# Patient Record
Sex: FEMALE | Race: WHITE | NOT HISPANIC OR LATINO | Employment: UNEMPLOYED | URBAN - METROPOLITAN AREA
[De-identification: names, ages, dates, MRNs, and addresses within clinical notes are randomized per-mention and may not be internally consistent; named-entity substitution may affect disease eponyms.]

---

## 2023-04-20 ENCOUNTER — TELEPHONE (OUTPATIENT)
Dept: PRIMARY CARE | Facility: CLINIC | Age: 63
End: 2023-04-20
Payer: COMMERCIAL

## 2023-04-20 DIAGNOSIS — J30.9 ALLERGIC RHINITIS, UNSPECIFIED SEASONALITY, UNSPECIFIED TRIGGER: Primary | ICD-10-CM

## 2023-04-20 RX ORDER — FLUTICASONE PROPIONATE 50 MCG
2 SPRAY, SUSPENSION (ML) NASAL DAILY
Qty: 48 G | Refills: 1 | Status: SHIPPED | OUTPATIENT
Start: 2023-04-20

## 2023-04-20 RX ORDER — FLUTICASONE PROPIONATE 50 MCG
2 SPRAY, SUSPENSION (ML) NASAL DAILY
COMMUNITY
End: 2023-04-20 | Stop reason: SDUPTHER

## 2023-04-20 NOTE — TELEPHONE ENCOUNTER
Patient left vm on Rx line requesting Rx for Flonase Nasal Jewett to be sent to [facility] pharmacy.

## 2023-09-05 ENCOUNTER — TELEPHONE (OUTPATIENT)
Dept: PRIMARY CARE | Facility: CLINIC | Age: 63
End: 2023-09-05
Payer: COMMERCIAL

## 2023-09-05 DIAGNOSIS — I10 PRIMARY HYPERTENSION: Primary | ICD-10-CM

## 2023-09-05 NOTE — TELEPHONE ENCOUNTER
Name of medication & dose: lisinopril      Quantity & refills requested: n/a    Amount of medication remaining:  n/a    If out of medication, for how long?  n/a    Last office visit:  3/3/2023    Last labs (if applicable):      Future appointment?  10/2/2023    [facility]    Allergies updated.       The patient was informed the requested medication request will be processed within 3 business days unless they are contacted by a staff member to advise otherwise.

## 2023-09-06 RX ORDER — LISINOPRIL 20 MG/1
20 TABLET ORAL DAILY
COMMUNITY
Start: 2021-10-18 | End: 2023-09-06 | Stop reason: SDUPTHER

## 2023-09-06 RX ORDER — LISINOPRIL 20 MG/1
20 TABLET ORAL DAILY
Qty: 90 TABLET | Refills: 0 | Status: SHIPPED | OUTPATIENT
Start: 2023-09-06 | End: 2023-10-02 | Stop reason: SDUPTHER

## 2023-10-02 ENCOUNTER — OFFICE VISIT (OUTPATIENT)
Dept: PRIMARY CARE | Facility: CLINIC | Age: 63
End: 2023-10-02
Payer: COMMERCIAL

## 2023-10-02 VITALS
DIASTOLIC BLOOD PRESSURE: 75 MMHG | BODY MASS INDEX: 24.8 KG/M2 | WEIGHT: 142.6 LBS | SYSTOLIC BLOOD PRESSURE: 128 MMHG | OXYGEN SATURATION: 94 % | HEART RATE: 93 BPM

## 2023-10-02 DIAGNOSIS — B00.1 HERPES SIMPLEX LABIALIS: ICD-10-CM

## 2023-10-02 DIAGNOSIS — E11.9 TYPE 2 DIABETES MELLITUS WITHOUT COMPLICATION, WITHOUT LONG-TERM CURRENT USE OF INSULIN (MULTI): Primary | ICD-10-CM

## 2023-10-02 DIAGNOSIS — I10 HYPERTENSION, BENIGN: ICD-10-CM

## 2023-10-02 DIAGNOSIS — E78.2 MIXED HYPERLIPIDEMIA: ICD-10-CM

## 2023-10-02 DIAGNOSIS — F41.1 GENERALIZED ANXIETY DISORDER WITH PANIC ATTACKS: ICD-10-CM

## 2023-10-02 DIAGNOSIS — F41.0 GENERALIZED ANXIETY DISORDER WITH PANIC ATTACKS: ICD-10-CM

## 2023-10-02 PROBLEM — J30.9 ALLERGIC RHINITIS: Status: ACTIVE | Noted: 2023-10-02

## 2023-10-02 PROBLEM — J45.20 MILD INTERMITTENT ASTHMA WITHOUT COMPLICATION (HHS-HCC): Status: ACTIVE | Noted: 2023-10-02

## 2023-10-02 PROBLEM — M06.9 RHEUMATOID ARTHRITIS (MULTI): Status: ACTIVE | Noted: 2023-10-02

## 2023-10-02 PROCEDURE — 3078F DIAST BP <80 MM HG: CPT | Performed by: FAMILY MEDICINE

## 2023-10-02 PROCEDURE — 4010F ACE/ARB THERAPY RXD/TAKEN: CPT | Performed by: FAMILY MEDICINE

## 2023-10-02 PROCEDURE — 99214 OFFICE O/P EST MOD 30 MIN: CPT | Performed by: FAMILY MEDICINE

## 2023-10-02 PROCEDURE — 3074F SYST BP LT 130 MM HG: CPT | Performed by: FAMILY MEDICINE

## 2023-10-02 RX ORDER — IBUPROFEN 200 MG
TABLET ORAL
COMMUNITY
Start: 2022-09-09

## 2023-10-02 RX ORDER — TRAMADOL HYDROCHLORIDE 50 MG/1
TABLET ORAL
COMMUNITY

## 2023-10-02 RX ORDER — FOLIC ACID 1 MG/1
1 TABLET ORAL DAILY
COMMUNITY

## 2023-10-02 RX ORDER — METFORMIN HYDROCHLORIDE 500 MG/1
1 TABLET ORAL DAILY
COMMUNITY
End: 2023-10-02 | Stop reason: SDUPTHER

## 2023-10-02 RX ORDER — ATORVASTATIN CALCIUM 40 MG/1
1 TABLET, FILM COATED ORAL DAILY
COMMUNITY
Start: 2021-10-06 | End: 2023-10-02 | Stop reason: SDUPTHER

## 2023-10-02 RX ORDER — METHOTREXATE 2.5 MG/1
TABLET ORAL
COMMUNITY

## 2023-10-02 RX ORDER — LISINOPRIL 20 MG/1
20 TABLET ORAL DAILY
Qty: 90 TABLET | Refills: 1 | Status: SHIPPED | OUTPATIENT
Start: 2023-10-02 | End: 2023-12-13 | Stop reason: SDUPTHER

## 2023-10-02 RX ORDER — ATORVASTATIN CALCIUM 40 MG/1
40 TABLET, FILM COATED ORAL DAILY
Qty: 90 TABLET | Refills: 1 | Status: SHIPPED | OUTPATIENT
Start: 2023-10-02 | End: 2024-03-07 | Stop reason: SDUPTHER

## 2023-10-02 RX ORDER — CITALOPRAM 20 MG/1
20 TABLET, FILM COATED ORAL DAILY
Qty: 90 TABLET | Refills: 1 | Status: SHIPPED | OUTPATIENT
Start: 2023-10-02 | End: 2024-03-07 | Stop reason: SDUPTHER

## 2023-10-02 RX ORDER — CITALOPRAM 20 MG/1
1 TABLET, FILM COATED ORAL DAILY
COMMUNITY
End: 2023-10-02 | Stop reason: SDUPTHER

## 2023-10-02 RX ORDER — ACYCLOVIR 50 MG/G
CREAM TOPICAL
COMMUNITY
Start: 2023-03-03 | End: 2023-10-02 | Stop reason: SDUPTHER

## 2023-10-02 RX ORDER — ACYCLOVIR 50 MG/G
CREAM TOPICAL
Qty: 5 G | Refills: 1 | Status: SHIPPED | OUTPATIENT
Start: 2023-10-02

## 2023-10-02 RX ORDER — ETANERCEPT 50 MG/ML
SOLUTION SUBCUTANEOUS
COMMUNITY
Start: 2023-09-07

## 2023-10-02 RX ORDER — METFORMIN HYDROCHLORIDE 500 MG/1
500 TABLET ORAL DAILY
Qty: 90 TABLET | Refills: 1 | Status: SHIPPED | OUTPATIENT
Start: 2023-10-02 | End: 2024-03-07 | Stop reason: DRUGHIGH

## 2023-10-02 ASSESSMENT — ENCOUNTER SYMPTOMS
FATIGUE: 0
ABDOMINAL PAIN: 0
ARTHRALGIAS: 1
UNEXPECTED WEIGHT CHANGE: 0
SHORTNESS OF BREATH: 0

## 2023-10-02 ASSESSMENT — PATIENT HEALTH QUESTIONNAIRE - PHQ9
2. FEELING DOWN, DEPRESSED OR HOPELESS: NOT AT ALL
1. LITTLE INTEREST OR PLEASURE IN DOING THINGS: NOT AT ALL
SUM OF ALL RESPONSES TO PHQ9 QUESTIONS 1 AND 2: 0

## 2023-10-02 NOTE — PROGRESS NOTES
Subjective   Patient ID: Akua Fernandez is a 62 y.o. female who presents for Follow-up (Follow up /Needs citalopram, metformin, lisinnopril, atorvastatin, and acyclovir cream refilled).  HPI  Akua presents for follow up.  She feels well.    Review of Systems   Constitutional:  Negative for fatigue and unexpected weight change.   Eyes:  Negative for visual disturbance.   Respiratory:  Negative for shortness of breath.    Cardiovascular:  Negative for chest pain.   Gastrointestinal:  Negative for abdominal pain.   Musculoskeletal:  Positive for arthralgias.       Objective   Vitals:    10/02/23 1427   BP: 128/75   BP Location: Right arm   Patient Position: Sitting   BP Cuff Size: Adult   Pulse: 93   SpO2: 94%   Weight: 64.7 kg (142 lb 9.6 oz)      Physical Exam  Constitutional:       Appearance: Normal appearance.   HENT:      Head: Normocephalic and atraumatic.   Eyes:      Extraocular Movements: Extraocular movements intact.      Pupils: Pupils are equal, round, and reactive to light.   Cardiovascular:      Rate and Rhythm: Normal rate and regular rhythm.   Pulmonary:      Effort: Pulmonary effort is normal.      Breath sounds: Normal breath sounds.   Abdominal:      General: There is no distension.      Tenderness: There is no abdominal tenderness.   Musculoskeletal:      Cervical back: Normal range of motion and neck supple.   Neurological:      General: No focal deficit present.      Mental Status: She is alert and oriented to person, place, and time.   Psychiatric:         Mood and Affect: Mood normal.         Behavior: Behavior normal.         Assessment/Plan   There are no diagnoses linked to this encounter.    Problem List Items Addressed This Visit             ICD-10-CM    Type 2 diabetes mellitus without complication, without long-term current use of insulin (CMS/[facility]) - Primary E11.9     Due for repeat blood work         Relevant Medications    metFORMIN (Glucophage) 500 mg tablet    lisinopril 20 mg tablet     atorvastatin (Lipitor) 40 mg tablet    Other Relevant Orders    Hemoglobin A1c    Lipid panel    Mixed hyperlipidemia E78.2    Relevant Medications    atorvastatin (Lipitor) 40 mg tablet    Hypertension, benign I10     Good control, continue current medication         Relevant Medications    lisinopril 20 mg tablet    Generalized anxiety disorder with panic attacks F41.1, F41.0    Relevant Medications    citalopram (CeleXA) 20 mg tablet    Herpes simplex labialis B00.1    Relevant Medications    acyclovir (Zovirax) 5 % cream

## 2023-12-13 DIAGNOSIS — I10 HYPERTENSION, BENIGN: ICD-10-CM

## 2023-12-13 DIAGNOSIS — E11.9 TYPE 2 DIABETES MELLITUS WITHOUT COMPLICATION, WITHOUT LONG-TERM CURRENT USE OF INSULIN (MULTI): ICD-10-CM

## 2023-12-13 RX ORDER — LISINOPRIL 20 MG/1
20 TABLET ORAL DAILY
Qty: 90 TABLET | Refills: 1 | Status: SHIPPED | OUTPATIENT
Start: 2023-12-13 | End: 2024-06-07

## 2023-12-13 NOTE — TELEPHONE ENCOUNTER
Patient left vm on Rx line requesting refill on Lisinopril to be sent to local [facility] pharm in chart.

## 2024-03-07 ENCOUNTER — OFFICE VISIT (OUTPATIENT)
Dept: PRIMARY CARE | Facility: CLINIC | Age: 64
End: 2024-03-07
Payer: COMMERCIAL

## 2024-03-07 VITALS
HEART RATE: 107 BPM | BODY MASS INDEX: 25.57 KG/M2 | SYSTOLIC BLOOD PRESSURE: 130 MMHG | DIASTOLIC BLOOD PRESSURE: 74 MMHG | WEIGHT: 147 LBS | OXYGEN SATURATION: 96 %

## 2024-03-07 DIAGNOSIS — E78.2 MIXED HYPERLIPIDEMIA: ICD-10-CM

## 2024-03-07 DIAGNOSIS — F41.1 GENERALIZED ANXIETY DISORDER WITH PANIC ATTACKS: ICD-10-CM

## 2024-03-07 DIAGNOSIS — M06.9 RHEUMATOID ARTHRITIS, INVOLVING UNSPECIFIED SITE, UNSPECIFIED WHETHER RHEUMATOID FACTOR PRESENT (MULTI): ICD-10-CM

## 2024-03-07 DIAGNOSIS — J45.20 MILD INTERMITTENT ASTHMA WITHOUT COMPLICATION (HHS-HCC): ICD-10-CM

## 2024-03-07 DIAGNOSIS — F41.0 GENERALIZED ANXIETY DISORDER WITH PANIC ATTACKS: ICD-10-CM

## 2024-03-07 DIAGNOSIS — E11.9 TYPE 2 DIABETES MELLITUS WITHOUT COMPLICATION, WITHOUT LONG-TERM CURRENT USE OF INSULIN (MULTI): ICD-10-CM

## 2024-03-07 DIAGNOSIS — K51.90 ULCERATIVE COLITIS WITHOUT COMPLICATIONS, UNSPECIFIED LOCATION (MULTI): Primary | ICD-10-CM

## 2024-03-07 LAB — POC HEMOGLOBIN A1C: 9.9 % (ref 4.2–6.5)

## 2024-03-07 PROCEDURE — 3078F DIAST BP <80 MM HG: CPT | Performed by: FAMILY MEDICINE

## 2024-03-07 PROCEDURE — 83036 HEMOGLOBIN GLYCOSYLATED A1C: CPT | Performed by: FAMILY MEDICINE

## 2024-03-07 PROCEDURE — 99214 OFFICE O/P EST MOD 30 MIN: CPT | Performed by: FAMILY MEDICINE

## 2024-03-07 PROCEDURE — 3075F SYST BP GE 130 - 139MM HG: CPT | Performed by: FAMILY MEDICINE

## 2024-03-07 PROCEDURE — 4010F ACE/ARB THERAPY RXD/TAKEN: CPT | Performed by: FAMILY MEDICINE

## 2024-03-07 RX ORDER — ATORVASTATIN CALCIUM 40 MG/1
40 TABLET, FILM COATED ORAL DAILY
Qty: 90 TABLET | Refills: 1 | Status: SHIPPED | OUTPATIENT
Start: 2024-03-07 | End: 2024-09-03

## 2024-03-07 RX ORDER — CITALOPRAM 20 MG/1
20 TABLET, FILM COATED ORAL DAILY
Qty: 90 TABLET | Refills: 1 | Status: SHIPPED | OUTPATIENT
Start: 2024-03-07 | End: 2024-09-03

## 2024-03-07 ASSESSMENT — ENCOUNTER SYMPTOMS
SHORTNESS OF BREATH: 0
SLEEP DISTURBANCE: 0
FATIGUE: 0
NERVOUS/ANXIOUS: 0
BACK PAIN: 0
HYPERTENSION: 1
ABDOMINAL PAIN: 0
UNEXPECTED WEIGHT CHANGE: 0
ARTHRALGIAS: 0
DYSPHORIC MOOD: 0
NECK PAIN: 0

## 2024-03-07 ASSESSMENT — PATIENT HEALTH QUESTIONNAIRE - PHQ9
1. LITTLE INTEREST OR PLEASURE IN DOING THINGS: NOT AT ALL
2. FEELING DOWN, DEPRESSED OR HOPELESS: NOT AT ALL
SUM OF ALL RESPONSES TO PHQ9 QUESTIONS 1 AND 2: 0

## 2024-03-07 NOTE — PROGRESS NOTES
Subjective   Patient ID: Akua Fernandez is a 63 y.o. female who presents for Diabetes, Anxiety, Hyperlipidemia, and Hypertension (PT IS HERE FOR 6 MONTH FOLLOW UP, FOR DIABETES, HTN, ANXIETY).  Diabetes  Pertinent negatives for hypoglycemia include no nervousness/anxiousness. Pertinent negatives for diabetes include no chest pain and no fatigue.   Anxiety  Patient reports no chest pain, nervous/anxious behavior or shortness of breath.       Hyperlipidemia  Pertinent negatives include no chest pain or shortness of breath.   Hypertension  Associated symptoms include anxiety. Pertinent negatives include no chest pain, neck pain or shortness of breath.     Akua presents for follow up visit.  She generally feels well.  She has cut down to 3 cigarettes per day!!  She is taking Metformin in morning and tolerating better.  Has been recording blood sugar readings, mostly in 100 range.  Review of Systems   Constitutional:  Negative for fatigue and unexpected weight change.   Eyes:  Negative for visual disturbance.   Respiratory:  Negative for shortness of breath.    Cardiovascular:  Negative for chest pain.   Gastrointestinal:  Negative for abdominal pain.   Musculoskeletal:  Negative for arthralgias, back pain and neck pain.   Psychiatric/Behavioral:  Negative for dysphoric mood and sleep disturbance. The patient is not nervous/anxious.        Objective   Vitals:    03/07/24 1254   BP: 130/74   Pulse: 107   SpO2: 96%   Weight: 66.7 kg (147 lb)      Physical Exam    Assessment/Plan   Diagnoses and all orders for this visit:  Type 2 diabetes mellitus without complication, without long-term current use of insulin (CMS/[facility])  Mixed hyperlipidemia  Generalized anxiety disorder with panic attacks      Problem List Items Addressed This Visit             ICD-10-CM    Type 2 diabetes mellitus without complication, without long-term current use of insulin (CMS/[facility]) E11.9     A1c 9.9.  Will change oral medication to Synjardy, to increase  dose of Metformin and add SGLT-2.  Will follow up in 3 months.         Relevant Medications    atorvastatin (Lipitor) 40 mg tablet    empagliflozin-metformin (Synjardy XR) 5-1,000 mg 24 hr tablet    Other Relevant Orders    POCT glycosylated hemoglobin (Hb A1C) manually resulted    Follow Up In Primary Care - Established    Rheumatoid arthritis (CMS/[facility]) M06.9     Stable.         Mixed hyperlipidemia E78.2     Continue statin         Relevant Medications    atorvastatin (Lipitor) 40 mg tablet    Mild intermittent asthma without complication J45.20     Improved since cutting down on smoking!         Generalized anxiety disorder with panic attacks F41.1, F41.0     Currently stable         Relevant Medications    citalopram (CeleXA) 20 mg tablet    Ulcerative colitis without complications, unspecified location (CMS/[facility]) - Primary K51.90     Currently asymptomatic

## 2024-03-07 NOTE — ASSESSMENT & PLAN NOTE
A1c 9.9.  Will change oral medication to Synjardy, to increase dose of Metformin and add SGLT-2.  Will follow up in 3 months.

## 2024-03-25 ENCOUNTER — PROCEDURE VISIT (OUTPATIENT)
Dept: PRIMARY CARE | Facility: CLINIC | Age: 64
End: 2024-03-25
Payer: COMMERCIAL

## 2024-03-25 VITALS
BODY MASS INDEX: 25.29 KG/M2 | HEART RATE: 99 BPM | DIASTOLIC BLOOD PRESSURE: 73 MMHG | SYSTOLIC BLOOD PRESSURE: 118 MMHG | WEIGHT: 145.4 LBS | OXYGEN SATURATION: 94 %

## 2024-03-25 DIAGNOSIS — R20.9 DISTURBANCE OF SKIN SENSATION: ICD-10-CM

## 2024-03-25 DIAGNOSIS — L91.8 INFLAMED ACROCHORDON: Primary | ICD-10-CM

## 2024-03-25 PROCEDURE — 99212 OFFICE O/P EST SF 10 MIN: CPT | Performed by: FAMILY MEDICINE

## 2024-03-25 ASSESSMENT — PATIENT HEALTH QUESTIONNAIRE - PHQ9
1. LITTLE INTEREST OR PLEASURE IN DOING THINGS: NOT AT ALL
SUM OF ALL RESPONSES TO PHQ9 QUESTIONS 1 AND 2: 0
2. FEELING DOWN, DEPRESSED OR HOPELESS: NOT AT ALL

## 2024-03-25 ASSESSMENT — ENCOUNTER SYMPTOMS
ABDOMINAL PAIN: 0
FEVER: 0

## 2024-03-25 NOTE — PROGRESS NOTES
Subjective   Patient ID: Akua Fernandez is a 63 y.o. female who presents for No chief complaint on file..  HPI  Akua presents for removal of skin tags.  Review of Systems   Constitutional:  Negative for fever.   Gastrointestinal:  Negative for abdominal pain (tolerating Synjardy).       Objective   Vitals:    03/25/24 1426   BP: 118/73   Pulse: 99   SpO2: 94%   Weight: 66 kg (145 lb 6.4 oz)      Physical Exam  Constitutional:       General: She is not in acute distress.     Appearance: Normal appearance.   HENT:      Head: Normocephalic and atraumatic.      Right Ear: External ear normal.      Left Ear: External ear normal.      Nose: Nose normal.   Eyes:      Conjunctiva/sclera: Conjunctivae normal.   Pulmonary:      Effort: Pulmonary effort is normal. No respiratory distress.   Skin:         Neurological:      Mental Status: She is alert and oriented to person, place, and time.   Psychiatric:         Mood and Affect: Mood normal.         Behavior: Behavior normal.         Thought Content: Thought content normal.     Patient ID: Akua Fernandez is a 63 y.o. female.    Skin Tag Removal    Date/Time: 3/25/2024 2:57 PM    Performed by: Guru Pichardo MD  Authorized by: Guru Pichardo MD    Consent:     Consent obtained:  Verbal    Consent given by:  Patient    Risks, benefits, and alternatives were discussed: yes      Risks discussed:  Bleeding and infection    Alternatives discussed:  No treatment  Universal protocol:     Procedure explained and questions answered to patient or proxy's satisfaction: yes      Patient identity confirmed:  Verbally with patient  Indications:     Indications:  Disturbance skin sensation  Pre-procedure details:     Skin preparation:  Chlorhexidine with alcohol  Sedation:     Sedation type:  None  Anesthesia:     Anesthesia method:  Local infiltration    Local anesthetic:  Lidocaine 2% w/o epi  Procedure specific details:      Removed 10 acrochordons from anterior neck with sharp des  scissors after local anesthetic applied.  Sterile bandage applied to any that had some minimal bleeding  Post-procedure details:     Procedure completion:  Tolerated      Assessment/Plan   There are no diagnoses linked to this encounter.    Problem List Items Addressed This Visit    None  Visit Diagnoses         Codes    Inflamed acrochordon    -  Primary L91.8    Removed as detailed above.     Disturbance of skin sensation     R20.9

## 2024-04-05 ENCOUNTER — TELEPHONE (OUTPATIENT)
Dept: PRIMARY CARE | Facility: CLINIC | Age: 64
End: 2024-04-05
Payer: COMMERCIAL

## 2024-04-05 NOTE — TELEPHONE ENCOUNTER
PT notified she could start taking her previous medication, and if she was still experiencing stomach issues to let the DR know. PT stated she would start medication on 4/7/2024 to allow other medication to be out of her system.

## 2024-04-05 NOTE — TELEPHONE ENCOUNTER
Yes,    Akua can try going back to Metformin twice daily, if no improvement in stomach issues to let me know

## 2024-05-08 DIAGNOSIS — E11.9 TYPE 2 DIABETES MELLITUS WITHOUT COMPLICATION, WITHOUT LONG-TERM CURRENT USE OF INSULIN (MULTI): ICD-10-CM

## 2024-05-08 NOTE — TELEPHONE ENCOUNTER
PT called RX refill line requesting a refill for her metformin to be sent to the [facility] pharmacy.  Last OV 3/7/24  Last RX fill 3/7/24 90 tablets/0 refills daily

## 2024-05-15 ENCOUNTER — TELEPHONE (OUTPATIENT)
Dept: PRIMARY CARE | Facility: CLINIC | Age: 64
End: 2024-05-15
Payer: COMMERCIAL

## 2024-05-15 NOTE — TELEPHONE ENCOUNTER
PT called RX refill line requesting a refill on metformin to be sent to the [facility] pharmacy on [address] that is on file.    I called and left PT a vm that RX was sent on 5/8/24 to the pharmacy for a 90 day supply if she had any questions to call the office.

## 2024-05-16 ENCOUNTER — HOSPITAL ENCOUNTER (OUTPATIENT)
Dept: RADIOLOGY | Facility: CLINIC | Age: 64
Discharge: HOME | End: 2024-05-16
Payer: COMMERCIAL

## 2024-05-16 VITALS — BODY MASS INDEX: 24.75 KG/M2 | HEIGHT: 64 IN | WEIGHT: 145 LBS

## 2024-05-16 DIAGNOSIS — R92.8 OTHER ABNORMAL AND INCONCLUSIVE FINDINGS ON DIAGNOSTIC IMAGING OF BREAST: ICD-10-CM

## 2024-05-16 PROCEDURE — 76642 ULTRASOUND BREAST LIMITED: CPT | Mod: 50

## 2024-05-16 PROCEDURE — 77062 BREAST TOMOSYNTHESIS BI: CPT

## 2024-05-19 DIAGNOSIS — E11.9 TYPE 2 DIABETES MELLITUS WITHOUT COMPLICATION, WITHOUT LONG-TERM CURRENT USE OF INSULIN (MULTI): ICD-10-CM

## 2024-05-20 RX ORDER — METFORMIN HYDROCHLORIDE 1000 MG/1
1000 TABLET ORAL
Qty: 180 TABLET | Refills: 3 | Status: SHIPPED | OUTPATIENT
Start: 2024-05-20 | End: 2025-05-20

## 2024-06-07 DIAGNOSIS — E11.9 TYPE 2 DIABETES MELLITUS WITHOUT COMPLICATION, WITHOUT LONG-TERM CURRENT USE OF INSULIN (MULTI): ICD-10-CM

## 2024-06-07 DIAGNOSIS — I10 HYPERTENSION, BENIGN: ICD-10-CM

## 2024-06-07 RX ORDER — LISINOPRIL 20 MG/1
20 TABLET ORAL DAILY
Qty: 90 TABLET | Refills: 1 | Status: SHIPPED | OUTPATIENT
Start: 2024-06-07

## 2024-12-05 ENCOUNTER — APPOINTMENT (OUTPATIENT)
Dept: RADIOLOGY | Facility: HOSPITAL | Age: 64
End: 2024-12-05
Payer: COMMERCIAL

## 2024-12-05 ENCOUNTER — HOSPITAL ENCOUNTER (OUTPATIENT)
Dept: RADIOLOGY | Facility: CLINIC | Age: 64
Discharge: HOME | End: 2024-12-05
Payer: COMMERCIAL

## 2024-12-05 DIAGNOSIS — R92.8 OTHER ABNORMAL AND INCONCLUSIVE FINDINGS ON DIAGNOSTIC IMAGING OF BREAST: ICD-10-CM

## 2024-12-05 DIAGNOSIS — R92.8 ABNORMAL FINDING ON BREAST IMAGING: ICD-10-CM

## 2024-12-05 PROCEDURE — 77062 BREAST TOMOSYNTHESIS BI: CPT

## 2024-12-05 PROCEDURE — 76642 ULTRASOUND BREAST LIMITED: CPT | Mod: RT

## 2024-12-05 PROCEDURE — 76982 USE 1ST TARGET LESION: CPT | Mod: RT

## 2024-12-05 NOTE — PROGRESS NOTES
I had the pleasure of meeting with Akua and discussing her need for a right breast biopsy. I scheduled her with Dr. Sood on 12-9 and then her biopsy on 12-10. I explained the process and procedure. Patient states that she understands. I gave her the folder and my card to call with any further questions.

## 2024-12-09 ENCOUNTER — OFFICE VISIT (OUTPATIENT)
Dept: SURGERY | Facility: CLINIC | Age: 64
End: 2024-12-09
Payer: COMMERCIAL

## 2024-12-09 VITALS
OXYGEN SATURATION: 95 % | BODY MASS INDEX: 23.66 KG/M2 | WEIGHT: 142 LBS | DIASTOLIC BLOOD PRESSURE: 68 MMHG | TEMPERATURE: 97.8 F | RESPIRATION RATE: 18 BRPM | HEIGHT: 65 IN | SYSTOLIC BLOOD PRESSURE: 129 MMHG | HEART RATE: 104 BPM

## 2024-12-09 DIAGNOSIS — R92.8 ABNORMAL MAMMOGRAM OF RIGHT BREAST: Primary | ICD-10-CM

## 2024-12-09 PROCEDURE — 3074F SYST BP LT 130 MM HG: CPT | Performed by: SURGERY

## 2024-12-09 PROCEDURE — 4010F ACE/ARB THERAPY RXD/TAKEN: CPT | Performed by: SURGERY

## 2024-12-09 PROCEDURE — 3078F DIAST BP <80 MM HG: CPT | Performed by: SURGERY

## 2024-12-09 PROCEDURE — 3008F BODY MASS INDEX DOCD: CPT | Performed by: SURGERY

## 2024-12-09 PROCEDURE — 99213 OFFICE O/P EST LOW 20 MIN: CPT | Performed by: SURGERY

## 2024-12-09 PROCEDURE — 99203 OFFICE O/P NEW LOW 30 MIN: CPT | Performed by: SURGERY

## 2024-12-09 SDOH — ECONOMIC STABILITY: FOOD INSECURITY: WITHIN THE PAST 12 MONTHS, THE FOOD YOU BOUGHT JUST DIDN'T LAST AND YOU DIDN'T HAVE MONEY TO GET MORE.: NEVER TRUE

## 2024-12-09 SDOH — ECONOMIC STABILITY: FOOD INSECURITY: WITHIN THE PAST 12 MONTHS, YOU WORRIED THAT YOUR FOOD WOULD RUN OUT BEFORE YOU GOT MONEY TO BUY MORE.: NEVER TRUE

## 2024-12-09 ASSESSMENT — PATIENT HEALTH QUESTIONNAIRE - PHQ9
2. FEELING DOWN, DEPRESSED OR HOPELESS: NOT AT ALL
1. LITTLE INTEREST OR PLEASURE IN DOING THINGS: NOT AT ALL
SUM OF ALL RESPONSES TO PHQ9 QUESTIONS 1 & 2: 0

## 2024-12-09 ASSESSMENT — LIFESTYLE VARIABLES
HOW OFTEN DO YOU HAVE SIX OR MORE DRINKS ON ONE OCCASION: NEVER
SKIP TO QUESTIONS 9-10: 1
HOW OFTEN DO YOU HAVE A DRINK CONTAINING ALCOHOL: MONTHLY OR LESS
HOW MANY STANDARD DRINKS CONTAINING ALCOHOL DO YOU HAVE ON A TYPICAL DAY: 1 OR 2
AUDIT-C TOTAL SCORE: 1

## 2024-12-09 ASSESSMENT — ENCOUNTER SYMPTOMS
OCCASIONAL FEELINGS OF UNSTEADINESS: 0
LOSS OF SENSATION IN FEET: 0
DEPRESSION: 0

## 2024-12-09 ASSESSMENT — PAIN SCALES - GENERAL: PAINLEVEL_OUTOF10: 0-NO PAIN

## 2024-12-09 ASSESSMENT — COLUMBIA-SUICIDE SEVERITY RATING SCALE - C-SSRS
6. HAVE YOU EVER DONE ANYTHING, STARTED TO DO ANYTHING, OR PREPARED TO DO ANYTHING TO END YOUR LIFE?: NO
2. HAVE YOU ACTUALLY HAD ANY THOUGHTS OF KILLING YOURSELF?: NO
1. IN THE PAST MONTH, HAVE YOU WISHED YOU WERE DEAD OR WISHED YOU COULD GO TO SLEEP AND NOT WAKE UP?: NO

## 2024-12-09 NOTE — PROGRESS NOTES
History Of Present Illness  HPI   63-year-old female  3 para 4 underwent 6-month bilateral follow-up mammograms with ultrasounds for multiple densities which demonstrated increase in size of a solid lesion identified at the 1 o'clock position of the right breast.  She currently denies any mass dimpling discharge.  No prior history of breast surgery.  Her father  from metastatic prostate cancer diagnosed when he was in his 70s.  She is an everyday smoker.  Did not breast-feed.  Age of first menstrual period 14.  Age of first childbirth 26.  Last menstrual period was several years ago she does not recall when.  She does have a history of rheumatoid arthritis and ulcerative colitis.  Past Medical History  She has a past medical history of Endometrial hyperplasia, unspecified, Fracture of unspecified phalanx of unspecified finger, initial encounter for closed fracture (2021), Open bite of unspecified finger without damage to nail, initial encounter (2021), and Personal history of other diseases of the digestive system.    Surgical History  She has a past surgical history that includes Other surgical history (2021); Other surgical history (2021); and Other surgical history (2021).     Social History  She reports that she has been smoking cigarettes. She has never used smokeless tobacco. She reports current alcohol use. She reports that she does not use drugs.    Family History  Family History   Problem Relation Name Age of Onset    Breast cancer Mother  50 - 59        Allergies  Sulfur, Sulfa (sulfonamide antibiotics), and Sulfasalazine    Review of Systems 10 review of systems otherwise negative     Visit Vitals  /68   Pulse 104   Temp 36.6 °C (97.8 °F)   Resp 18        Physical Exam GENERAL  MENTAL STATUS - Alert. GENERAL APPEARANCE - Cooperative and well groomed. ORIENTATION - Oriented X4. BUILD & NUTRITION - Well nourished and well developed. HYDRATION - Well  "hydrated.    INTEGUMENTARY  GENERAL CHARACTERISTICS - Overall examination of the patient's skin reveals no rashes. COLOR - not icteric. SKIN MOISTURE - normal skin moisture. TEMPERATURE - normal worth is noted.    HEAD AND NECK  HEAD  HEAD SHAPE - Atraumatic and normocephalic.  TRACHEA - midline.  THYROID  GLAND CHARACTERISTICS - normal size and consistency and no palpable nodules.    EYE  SCLERA/CONJUNCTIVA - BILATERAL - Anicteric.    CHEST AND LUNG EXAM  AUSCULTATION -  BREATH SOUNDS - Clear.    BREAST  NIPPLES: CHARACTERISTICS - LEFT - No rash. Not inverted. RIGHT - No rash. Not Inverted. DISCHARGE - LEFT - None. DISCHARGE - RIGHT - None.  BREAST - LEFT - Non tender. No mass, skin changes, biopsy scars, dimpling or dimpling or [place]au [place]. RIGHT - Non tender. No mass, skin changes, biopsy scars, dimpling or dimpling or [place]. BILATERAL - Symmetric.    CARDIOVASCULAR  AUSCULTATION: RHYTHM - Regular. HEART SOUNDS - Normal heart sounds.  MURMURS & OTHER HEART SOUNDS - Auscultation of the heart reveals regular rate and no murmurs.    ABDOMEN  PALPATION/PERCUSSION - Palpation and percussion of the abdomen reveal soft, non tender, no mass and no hepatosplenomegaly.    LYMPHATIC  GENERAL LYMPHATICS  BREAST LYMPHATIC EXAM - No cervical adenopathy, supraclavicular adenopathy or axilliary adenopathy.         Last Recorded Vitals  Blood pressure 129/68, pulse 104, temperature 36.6 °C (97.8 °F), resp. rate 18, height 1.651 m (5' 5\"), weight 64.4 kg (142 lb), SpO2 95%.    Relevant Results      BI mammo bilateral diagnostic tomosynthesis    Result Date: 12/5/2024  Interpreted By:  Giancarlo Santos, STUDY: BI MAMMO BILATERAL DIAGNOSTIC TOMOSYNTHESIS; BI US BREAST LIMITED RIGHT;  12/5/2024 1:47 pm; 12/5/2024 2:27 pm   ACCESSION NUMBER(S): [id number]; [id number]   ORDERING CLINICIAN: ANNA KELLER   INDICATION: Six-month follow-up bilateral asymmetries. Family history of breast cancer patient's mother.   ,R92.8 Other " abnormal and inconclusive findings on diagnostic imaging of breast   COMPARISON: Mammogram and ultrasound 05/16/2024, 08/17/2023 and 01/17/2023. Screening mammogram 12/13/2022 and 03/17/2021   FINDINGS: MAMMOGRAPHY: 2D and tomosynthesis images were reviewed at 1 mm slice thickness.   Density:  The breasts are heterogeneously dense, which may obscure small masses.   Central right breast nodule probably superiorly located on the MLO view slightly more pronounced when compared to prior studies. Lateral right breast asymmetry is unchanged. Posterolateral left breast asymmetries also unchanged. No definite additional new suspicious masses or calcifications are otherwise identified.   ULTRASOUND: Targeted ultrasound was performed of the right breast by a registered sonographer with elastography. Grayscale and color imaging reviewed.   At 1 o'clock 4 cm from nipple, there is an ovoid wider than tall but mildly heterogeneous and lobulated hypoechoic nodular focus measuring up to 6 x 3 x 4 mm. No suspicious internal vascularity and soft on elastography. This probably correlates with the nodule seen mammographically. Evaluation of the right axilla shows some mildly prominent but otherwise not suspicious appearing lymph nodes. A lymph node measures 6 x 3 x 6 mm, and another measuring 16 x 8 x 14 mm. Fatty manuel are demonstrated.       Hypoechoic right breast nodule as described appearing slightly increased in size from prior studies and for which ultrasound guided biopsy recommended.   Additional probably benign mammographic asymmetries elsewhere as described. Suggest additional short interval six-month follow-up bilateral mammography to evaluate continued stability.   Findings were communicated to the patient.   BI-RADS CATEGORY: BI-RADS Category:  4 Suspicious. Recommendation:  Surgical Consultation and Biopsy. Recommended Date:  Immediate. Laterality:  Right.   For any future breast imaging appointments, please call  [phone] ([phone]).     MACRO: Critical Finding:  See findings. Notification was initiated on 12/5/2024 at 5:10 pm by  Giancarlo Santos.  (**-YCF-**) Instructions: See Impression for specific recommendations.   Signed by: Giancarlo Santos 12/5/2024 5:13 PM Dictation workstation:   [id number]    BI US breast limited right    Result Date: 12/5/2024  Interpreted By:  Giancarlo Santos, STUDY: BI MAMMO BILATERAL DIAGNOSTIC TOMOSYNTHESIS; BI US BREAST LIMITED RIGHT;  12/5/2024 1:47 pm; 12/5/2024 2:27 pm   ACCESSION NUMBER(S): [id number]; [id number]   ORDERING CLINICIAN: ANNA KELLER   INDICATION: Six-month follow-up bilateral asymmetries. Family history of breast cancer patient's mother.   ,R92.8 Other abnormal and inconclusive findings on diagnostic imaging of breast   COMPARISON: Mammogram and ultrasound 05/16/2024, 08/17/2023 and 01/17/2023. Screening mammogram 12/13/2022 and 03/17/2021   FINDINGS: MAMMOGRAPHY: 2D and tomosynthesis images were reviewed at 1 mm slice thickness.   Density:  The breasts are heterogeneously dense, which may obscure small masses.   Central right breast nodule probably superiorly located on the MLO view slightly more pronounced when compared to prior studies. Lateral right breast asymmetry is unchanged. Posterolateral left breast asymmetries also unchanged. No definite additional new suspicious masses or calcifications are otherwise identified.   ULTRASOUND: Targeted ultrasound was performed of the right breast by a registered sonographer with elastography. Grayscale and color imaging reviewed.   At 1 o'clock 4 cm from nipple, there is an ovoid wider than tall but mildly heterogeneous and lobulated hypoechoic nodular focus measuring up to 6 x 3 x 4 mm. No suspicious internal vascularity and soft on elastography. This probably correlates with the nodule seen mammographically. Evaluation of the right axilla shows some mildly prominent but otherwise not suspicious appearing lymph nodes. A lymph  node measures 6 x 3 x 6 mm, and another measuring 16 x 8 x 14 mm. Fatty manuel are demonstrated.       Hypoechoic right breast nodule as described appearing slightly increased in size from prior studies and for which ultrasound guided biopsy recommended.   Additional probably benign mammographic asymmetries elsewhere as described. Suggest additional short interval six-month follow-up bilateral mammography to evaluate continued stability.   Findings were communicated to the patient.   BI-RADS CATEGORY: BI-RADS Category:  4 Suspicious. Recommendation:  Surgical Consultation and Biopsy. Recommended Date:  Immediate. Laterality:  Right.   For any future breast imaging appointments, please call [phone] ([phone]).     MACRO: Critical Finding:  See findings. Notification was initiated on 12/5/2024 at 5:10 pm by  Giancarlo Santos.  (**-YCF-**) Instructions: See Impression for specific recommendations.   Signed by: Giancarlo Santos 12/5/2024 5:13 PM Dictation workstation:   [id number]        @[employer]@    Assessment/Plan       I have reviewed the diagnostic imaging and agree with the recommendation for ultrasound-guided biopsy of the right breast for enlarging solid mass.  I discussed the risk the benefits the alternatives as well as the risk and benefits the alternatives to the procedure.  I discussed the potential of benign findings, malignant findings, inability to localize the lesion secondary to size as well as diagnosis requiring additional tissue for further diagnosis and/or treatment.  I discussed the risk of bleeding infection and again the need for additional procedures for diagnosis and/or treatment.  Questions were answered.  Patient agrees to proceed       I spent 30 minutes in the professional and overall care of this patient.      Talon Sood MD

## 2024-12-09 NOTE — PATIENT INSTRUCTIONS
Thank you for choosing [facility].  Ultrasound guided biopsy of the right breast will be performed as discussed.  You may experience some bruising following the procedure.  Please use ice, 2 extra strength or 3 ibuprofen every 6 hours as needed for pain.  Results will be immediately available for viewing on the "[employer]" emerita when completed by the pathologist.  This is usually within 5-10 business days.  Follow-up breast clinic 2 weeks after your biopsy.  Contact the breast clinic for any questions regarding today's exam or your proposed procedure biopsy breast biopsy breast

## 2024-12-10 ENCOUNTER — HOSPITAL ENCOUNTER (OUTPATIENT)
Dept: RADIOLOGY | Facility: CLINIC | Age: 64
Discharge: HOME | End: 2024-12-10
Payer: COMMERCIAL

## 2024-12-10 VITALS
HEART RATE: 86 BPM | RESPIRATION RATE: 20 BRPM | OXYGEN SATURATION: 97 % | DIASTOLIC BLOOD PRESSURE: 74 MMHG | SYSTOLIC BLOOD PRESSURE: 127 MMHG

## 2024-12-10 DIAGNOSIS — R92.8 ABNORMAL FINDING ON BREAST IMAGING: ICD-10-CM

## 2024-12-10 PROCEDURE — 19083 BX BREAST 1ST LESION US IMAG: CPT | Mod: RT

## 2024-12-10 PROCEDURE — 2720000007 HC OR 272 NO HCPCS

## 2024-12-10 PROCEDURE — A4648 IMPLANTABLE TISSUE MARKER: HCPCS

## 2024-12-10 NOTE — POST-PROCEDURE NOTE
Interventional Radiology Brief Postprocedure Note    Attending: Nupur Mckenzie MD      Assistant:     Diagnosis: right breast mass    Description of procedure: ultrasound guided right breast biopsy     Anesthesia:  Local    Complications: None    Estimated Blood Loss: none    Medications (Filter: Administrations occurring from 1128 to 1128 on 12/10/24)      None          ID Type Source Tests Collected by Time   1 : RIGHT BREAST 1300 4 CM FN Tissue BREAST CORE BIOPSY RIGHT SURGICAL PATHOLOGY EXAM Nupur Mckenzie MD 12/10/2024 1110         See detailed result report with images in PACS.    The patient tolerated the procedure well without incident or complication and is in stable condition.

## 2024-12-10 NOTE — DISCHARGE INSTRUCTIONS
REMOVE YOUR BANDAID OVER THE SITE TOMORROW BEFORE YOU SHOWER. UNDER THE BANDAID ARE STERI-STRIPS, KEEP THOSE ON UNTIL THEY FALL OFF ON THEIR OWN. IF THEY STAY ON FOR 5 DAYS, REMOVE THEM. NO SOAKING IN A BATH, POOL, OR HOT TUB FOR 48 HOURS. NO HEAVY LIFTING FOR 1-2 DAYS. USE ICE 20 MINUTES ON AND 20 MINUTES OFF TODAY. LOOK FOR SIGNS AND SYMPTOMS OF INFECTION- REDNESS, SWELLING, FEVER, AND PAIN. CALL IF YOU HAVE ANY OF THOSE. IT IS NORMAL TO BRUISE. IT CAN LAST UP TO A MONTH. TAKE OVER THE COUNTER PAIN MEDICATIONS FOR PAIN. YOUR RESULTS WILL POST TO YOUR MY CHART IN 3-10 DAYS. CALL WITH ANY QUESTIONS, [phone] OR JARRETT BREAST NURSE NAVIGATOR [phone].

## 2024-12-14 LAB
LABORATORY COMMENT REPORT: NORMAL
PATH REPORT.FINAL DX SPEC: NORMAL
PATH REPORT.GROSS SPEC: NORMAL
PATH REPORT.RELEVANT HX SPEC: NORMAL
PATH REPORT.TOTAL CANCER: NORMAL

## 2024-12-23 ENCOUNTER — OFFICE VISIT (OUTPATIENT)
Dept: SURGERY | Facility: CLINIC | Age: 64
End: 2024-12-23
Payer: COMMERCIAL

## 2024-12-23 VITALS
WEIGHT: 138 LBS | HEART RATE: 86 BPM | DIASTOLIC BLOOD PRESSURE: 93 MMHG | OXYGEN SATURATION: 97 % | SYSTOLIC BLOOD PRESSURE: 155 MMHG | RESPIRATION RATE: 16 BRPM | HEIGHT: 65 IN | BODY MASS INDEX: 22.99 KG/M2 | TEMPERATURE: 98.4 F

## 2024-12-23 DIAGNOSIS — E11.9 TYPE 2 DIABETES MELLITUS WITHOUT COMPLICATION, WITHOUT LONG-TERM CURRENT USE OF INSULIN (MULTI): ICD-10-CM

## 2024-12-23 DIAGNOSIS — R92.8 ABNORMAL MAMMOGRAM OF RIGHT BREAST: Primary | ICD-10-CM

## 2024-12-23 PROCEDURE — 3008F BODY MASS INDEX DOCD: CPT | Performed by: SURGERY

## 2024-12-23 PROCEDURE — 3080F DIAST BP >= 90 MM HG: CPT | Performed by: SURGERY

## 2024-12-23 PROCEDURE — 3077F SYST BP >= 140 MM HG: CPT | Performed by: SURGERY

## 2024-12-23 PROCEDURE — 4010F ACE/ARB THERAPY RXD/TAKEN: CPT | Performed by: SURGERY

## 2024-12-23 PROCEDURE — 99213 OFFICE O/P EST LOW 20 MIN: CPT | Performed by: SURGERY

## 2024-12-23 ASSESSMENT — ENCOUNTER SYMPTOMS
DEPRESSION: 0
LOSS OF SENSATION IN FEET: 0
OCCASIONAL FEELINGS OF UNSTEADINESS: 0

## 2024-12-23 ASSESSMENT — PAIN SCALES - GENERAL: PAINLEVEL_OUTOF10: 0-NO PAIN

## 2024-12-27 NOTE — PATIENT INSTRUCTIONS
Thank you for choosing [facility].  Your recent right breast biopsy demonstrates no evidence of breast cancer nor cells to indicate you at increased risk for breast cancer.  Please continue monthly self-exam.  Please seek medical attention for any change in self-exam including but not limited to mass dimpling discharge or any concern.  Annual mammogram with exam is recommended for December of next year.  Contact the breast clinic for any questions regarding today's exam or your recent procedure.  The bruising will resolve.  Please contact the breast clinic for any redness swelling drainage or any concern

## 2024-12-27 NOTE — PROGRESS NOTES
History Of Present Illness  HPI follow-up to ultrasound-guided core biopsy of the right breast at the 1 o'clock position on December 10.  Pathology benign with fibroadenomatous change only.  Concordant.  Indicates minor bruising only.     Past Medical History  She has a past medical history of Endometrial hyperplasia, unspecified, Fracture of unspecified phalanx of unspecified finger, initial encounter for closed fracture (03/16/2021), Open bite of unspecified finger without damage to nail, initial encounter (02/09/2021), and Personal history of other diseases of the digestive system.    Surgical History  She has a past surgical history that includes Other surgical history (04/06/2021); Other surgical history (04/06/2021); and Other surgical history (04/06/2021).     Social History  She reports that she has been smoking cigarettes. She has never used smokeless tobacco. She reports current alcohol use. She reports that she does not use drugs.    Family History  Family History   Problem Relation Name Age of Onset    Breast cancer Mother  50 - 59        Allergies  Sulfur, Sulfa (sulfonamide antibiotics), and Sulfasalazine    Review of Systems 10 review of systems otherwise negative     Visit Vitals  BP (!) 155/93 (BP Location: Left arm, Patient Position: Sitting, BP Cuff Size: Adult)   Pulse 86   Temp 36.9 °C (98.4 °F) (Temporal)   Resp 16        Physical Exam GENERAL  MENTAL STATUS - Alert. GENERAL APPEARANCE - Cooperative and well groomed. ORIENTATION - Oriented X4. BUILD & NUTRITION - Well nourished and well developed. HYDRATION - Well hydrated.    INTEGUMENTARY  GENERAL CHARACTERISTICS - Overall examination of the patient's skin reveals no rashes. COLOR - not icteric. SKIN MOISTURE - normal skin moisture. TEMPERATURE - normal worth is noted.    HEAD AND NECK  HEAD  HEAD SHAPE - Atraumatic and normocephalic.  TRACHEA - midline.  THYROID  GLAND CHARACTERISTICS - normal size and consistency and no palpable  "nodules.    EYE  SCLERA/CONJUNCTIVA - BILATERAL - Anicteric.    CHEST AND LUNG EXAM  AUSCULTATION -  BREATH SOUNDS - Clear.    BREAST  NIPPLES: CHARACTERISTICS - LEFT - No rash. Not inverted. RIGHT - No rash. Not Inverted. DISCHARGE - LEFT - None. DISCHARGE - RIGHT - None.  BREAST - LEFT - Non tender. No mass, skin changes, biopsy scars, dimpling or dimpling or [place]. RIGHT - Non tender. No mass, skin changes, biopsy scars, dimpling or dimpling or [place].  Minimal ecchymosis right breast BILATERAL - Symmetric.    CARDIOVASCULAR  AUSCULTATION: RHYTHM - Regular. HEART SOUNDS - Normal heart sounds.  MURMURS & OTHER HEART SOUNDS - Auscultation of the heart reveals regular rate and no murmurs.    ABDOMEN  PALPATION/PERCUSSION - Palpation and percussion of the abdomen reveal soft, non tender, no mass and no hepatosplenomegaly.    LYMPHATIC  GENERAL LYMPHATICS  BREAST LYMPHATIC EXAM - No cervical adenopathy, supraclavicular adenopathy or axilliary adenopathy.         Last Recorded Vitals  Blood pressure (!) 155/93, pulse 86, temperature 36.9 °C (98.4 °F), temperature source Temporal, resp. rate 16, height 1.651 m (5' 5\"), weight 62.6 kg (138 lb), SpO2 97%.    Relevant Results      BI US guided breast localization and biopsy right    Addendum Date: 12/17/2024    Interpreted By:  Nupur Mckenzie, ADDENDUM: The final pathology for ultrasound-guided right breast biopsy: Small fragments of fibroadenomatous change Pathology is benign-concordant   Recommendation: Routine mammographic screening can resume   MACRO: Critical Finding:  See findings. Notification was initiated on 12/17/2024 at 5:34 pm by  Nupur Mckenzie.  (**-YCF-**) Instructions:     Signed by: Nupur Mckenzie 12/17/2024 5:34 PM   -------- ORIGINAL REPORT -------- Dictation workstation:   [id number]    Result Date: 12/17/2024  Interpreted By:  Nupur Mckenzie, STUDY: BI US GUIDED BREAST LOCALIZATION AND BIOPSY RIGHT; BI BREAST BIOPSY CLIP IMAGING;  " 12/10/2024 11:23 am; 12/10/2024 11:26 am   ACCESSION NUMBER(S): [id number]; [id number]   ORDERING CLINICIAN: JULIO CÉSAR JALLOH   INDICATION: Signs/Symptoms:right breast mass; Signs/Symptoms:post right breast biopsy.   TECHNIQUE: PREPROCEDURE CONSULTATION: The procedure was explained to the patient including the risks, benefits, and alternatives.  Medications were discussed, including any prior use of blood thinning medications by the patient.  Patient allergies were reviewed.   The risks, including but not limited to infection and bleeding, were reviewed and the patient agreed to undergo the procedure.   Prior to the procedure, an audible timeout was done to verify patient identification, site and type of procedure. Myself,  a radiology nurse and an ultrasound technologist were present.   FINDINGS: PROCEDURE: The skin of the  right breast was cleansed with alcohol and anesthetized with 1% lidocaine.   Under ultrasound guidance, a 14 gauge Bard disposable biopsy device was used to obtain multiple core biopsies of a  0.6 x 0.4 x 0.3 cm hypoechoic solid mass 1 o'clock position right breast, 4 cm from the nipple.   A  T3 HydroMARK clip was placed, confirmed sonographically.   The patient experienced no complications during the procedure. Home-going/follow-up instructions were reviewed with the patient before she left the department.   POSTPROCEDURE MAMMOGRAM:  There is a T3 HydroMARK clip at the site of the nodule on mammogram. This confirms that the area biopsied sonographically with the same area of concern mammographically. Postprocedure mammogram concordant.       1. Status post ultrasound-guided core biopsy of a  0.6 x 0.4 x 0.3 cm hypoechoic solid mass 1 o'clock position right breast, 4 cm from the nipple with placement of a  T3 HydroMARK clip. 2.  Postprocedure mammogram confirms that the mass biopsied sonographically is the same mass seen mammographically.   MACRO: None   Signed by: Nupur Mckenzie 12/10/2024  11:33 AM Dictation workstation:   [id number]    BI breast biopsy clip imaging    Addendum Date: 12/17/2024    Interpreted By:  Nupur Mckenzie, ADDENDUM: The final pathology for ultrasound-guided right breast biopsy: Small fragments of fibroadenomatous change Pathology is benign-concordant   Recommendation: Routine mammographic screening can resume   MACRO: Critical Finding:  See findings. Notification was initiated on 12/17/2024 at 5:34 pm by  Nupur Mckenzie.  (**-YCF-**) Instructions:     Signed by: Nupur Mckenzie 12/17/2024 5:34 PM   -------- ORIGINAL REPORT -------- Dictation workstation:   [id number]    Result Date: 12/17/2024  Interpreted By:  Nupur Mckenzie, STUDY: BI US GUIDED BREAST LOCALIZATION AND BIOPSY RIGHT; BI BREAST BIOPSY CLIP IMAGING;  12/10/2024 11:23 am; 12/10/2024 11:26 am   ACCESSION NUMBER(S): [id number]; [id number]   ORDERING CLINICIAN: JULIO CÉSAR JALLOH   INDICATION: Signs/Symptoms:right breast mass; Signs/Symptoms:post right breast biopsy.   TECHNIQUE: PREPROCEDURE CONSULTATION: The procedure was explained to the patient including the risks, benefits, and alternatives.  Medications were discussed, including any prior use of blood thinning medications by the patient.  Patient allergies were reviewed.   The risks, including but not limited to infection and bleeding, were reviewed and the patient agreed to undergo the procedure.   Prior to the procedure, an audible timeout was done to verify patient identification, site and type of procedure. Myself,  a radiology nurse and an ultrasound technologist were present.   FINDINGS: PROCEDURE: The skin of the  right breast was cleansed with alcohol and anesthetized with 1% lidocaine.   Under ultrasound guidance, a 14 gauge Bard disposable biopsy device was used to obtain multiple core biopsies of a  0.6 x 0.4 x 0.3 cm hypoechoic solid mass 1 o'clock position right breast, 4 cm from the nipple.   A  T3 HydroMARK clip was placed, confirmed  sonographically.   The patient experienced no complications during the procedure. Home-going/follow-up instructions were reviewed with the patient before she left the department.   POSTPROCEDURE MAMMOGRAM:  There is a T3 HydroMARK clip at the site of the nodule on mammogram. This confirms that the area biopsied sonographically with the same area of concern mammographically. Postprocedure mammogram concordant.       1. Status post ultrasound-guided core biopsy of a  0.6 x 0.4 x 0.3 cm hypoechoic solid mass 1 o'clock position right breast, 4 cm from the nipple with placement of a  T3 HydroMARK clip. 2.  Postprocedure mammogram confirms that the mass biopsied sonographically is the same mass seen mammographically.   MACRO: None   Signed by: Nupur Mckenzie 12/10/2024 11:33 AM Dictation workstation:   [id number]    BI mammo bilateral diagnostic tomosynthesis    Result Date: 12/5/2024  Interpreted By:  Giancarlo Santos, STUDY: BI MAMMO BILATERAL DIAGNOSTIC TOMOSYNTHESIS; BI US BREAST LIMITED RIGHT;  12/5/2024 1:47 pm; 12/5/2024 2:27 pm   ACCESSION NUMBER(S): [id number]; [id number]   ORDERING CLINICIAN: ANNA KELLER   INDICATION: Six-month follow-up bilateral asymmetries. Family history of breast cancer patient's mother.   ,R92.8 Other abnormal and inconclusive findings on diagnostic imaging of breast   COMPARISON: Mammogram and ultrasound 05/16/2024, 08/17/2023 and 01/17/2023. Screening mammogram 12/13/2022 and 03/17/2021   FINDINGS: MAMMOGRAPHY: 2D and tomosynthesis images were reviewed at 1 mm slice thickness.   Density:  The breasts are heterogeneously dense, which may obscure small masses.   Central right breast nodule probably superiorly located on the MLO view slightly more pronounced when compared to prior studies. Lateral right breast asymmetry is unchanged. Posterolateral left breast asymmetries also unchanged. No definite additional new suspicious masses or calcifications are otherwise identified.    ULTRASOUND: Targeted ultrasound was performed of the right breast by a registered sonographer with elastography. Grayscale and color imaging reviewed.   At 1 o'clock 4 cm from nipple, there is an ovoid wider than tall but mildly heterogeneous and lobulated hypoechoic nodular focus measuring up to 6 x 3 x 4 mm. No suspicious internal vascularity and soft on elastography. This probably correlates with the nodule seen mammographically. Evaluation of the right axilla shows some mildly prominent but otherwise not suspicious appearing lymph nodes. A lymph node measures 6 x 3 x 6 mm, and another measuring 16 x 8 x 14 mm. Fatty manuel are demonstrated.       Hypoechoic right breast nodule as described appearing slightly increased in size from prior studies and for which ultrasound guided biopsy recommended.   Additional probably benign mammographic asymmetries elsewhere as described. Suggest additional short interval six-month follow-up bilateral mammography to evaluate continued stability.   Findings were communicated to the patient.   BI-RADS CATEGORY: BI-RADS Category:  4 Suspicious. Recommendation:  Surgical Consultation and Biopsy. Recommended Date:  Immediate. Laterality:  Right.   For any future breast imaging appointments, please call [phone]).     MACRO: Critical Finding:  See findings. Notification was initiated on 12/5/2024 at 5:10 pm by  Giancarlo Santos.  (**-YCF-**) Instructions: See Impression for specific recommendations.   Signed by: Giancarlo Santos 12/5/2024 5:13 PM Dictation workstation:   [id number]    BI US breast limited right    Result Date: 12/5/2024  Interpreted By:  Giancarlo Santos, STUDY: BI MAMMO BILATERAL DIAGNOSTIC TOMOSYNTHESIS; BI US BREAST LIMITED RIGHT;  12/5/2024 1:47 pm; 12/5/2024 2:27 pm   ACCESSION NUMBER(S): [id number]; [id number]   ORDERING CLINICIAN: ANNA KELLER   INDICATION: Six-month follow-up bilateral asymmetries. Family history of breast cancer patient's mother.   ,R92.8  Other abnormal and inconclusive findings on diagnostic imaging of breast   COMPARISON: Mammogram and ultrasound 05/16/2024, 08/17/2023 and 01/17/2023. Screening mammogram 12/13/2022 and 03/17/2021   FINDINGS: MAMMOGRAPHY: 2D and tomosynthesis images were reviewed at 1 mm slice thickness.   Density:  The breasts are heterogeneously dense, which may obscure small masses.   Central right breast nodule probably superiorly located on the MLO view slightly more pronounced when compared to prior studies. Lateral right breast asymmetry is unchanged. Posterolateral left breast asymmetries also unchanged. No definite additional new suspicious masses or calcifications are otherwise identified.   ULTRASOUND: Targeted ultrasound was performed of the right breast by a registered sonographer with elastography. Grayscale and color imaging reviewed.   At 1 o'clock 4 cm from nipple, there is an ovoid wider than tall but mildly heterogeneous and lobulated hypoechoic nodular focus measuring up to 6 x 3 x 4 mm. No suspicious internal vascularity and soft on elastography. This probably correlates with the nodule seen mammographically. Evaluation of the right axilla shows some mildly prominent but otherwise not suspicious appearing lymph nodes. A lymph node measures 6 x 3 x 6 mm, and another measuring 16 x 8 x 14 mm. Fatty manuel are demonstrated.       Hypoechoic right breast nodule as described appearing slightly increased in size from prior studies and for which ultrasound guided biopsy recommended.   Additional probably benign mammographic asymmetries elsewhere as described. Suggest additional short interval six-month follow-up bilateral mammography to evaluate continued stability.   Findings were communicated to the patient.   BI-RADS CATEGORY: BI-RADS Category:  4 Suspicious. Recommendation:  Surgical Consultation and Biopsy. Recommended Date:  Immediate. Laterality:  Right.   For any future breast imaging appointments, please call  [phone]).     MACRO: Critical Finding:  See findings. Notification was initiated on 12/5/2024 at 5:10 pm by  Giancarlo Santos.  (**-YCF-**) Instructions: See Impression for specific recommendations.   Signed by: Giancarlo Santos 12/5/2024 5:13 PM Dictation workstation:   [id number]        @Okan@    Assessment/Plan       I reviewed the pathology with the patient.  Benign.  Annual mammograms recommended.  Advised to continue monthly self-exam.  Follow-up with diagnostic imaging 1 year from last bilateral imaging.       I spent 20 minutes in the professional and overall care of this patient.      Talon Sood MD